# Patient Record
Sex: FEMALE | Race: WHITE
[De-identification: names, ages, dates, MRNs, and addresses within clinical notes are randomized per-mention and may not be internally consistent; named-entity substitution may affect disease eponyms.]

---

## 2021-03-19 ENCOUNTER — HOSPITAL ENCOUNTER (OUTPATIENT)
Dept: HOSPITAL 53 - M SDC | Age: 46
Discharge: HOME | End: 2021-03-19
Attending: PODIATRIST
Payer: COMMERCIAL

## 2021-03-19 VITALS — BODY MASS INDEX: 32.02 KG/M2 | HEIGHT: 62 IN | WEIGHT: 174 LBS

## 2021-03-19 VITALS — DIASTOLIC BLOOD PRESSURE: 72 MMHG | SYSTOLIC BLOOD PRESSURE: 118 MMHG

## 2021-03-19 DIAGNOSIS — M76.821: ICD-10-CM

## 2021-03-19 DIAGNOSIS — M79.671: ICD-10-CM

## 2021-03-19 DIAGNOSIS — Z88.0: ICD-10-CM

## 2021-03-19 DIAGNOSIS — F32.9: ICD-10-CM

## 2021-03-19 DIAGNOSIS — Z79.899: ICD-10-CM

## 2021-03-19 DIAGNOSIS — F41.9: ICD-10-CM

## 2021-03-19 DIAGNOSIS — M66.361: Primary | ICD-10-CM

## 2021-03-19 DIAGNOSIS — Z79.01: ICD-10-CM

## 2021-03-19 PROCEDURE — 88304 TISSUE EXAM BY PATHOLOGIST: CPT

## 2021-03-19 PROCEDURE — 73630 X-RAY EXAM OF FOOT: CPT

## 2021-03-19 PROCEDURE — 27691 REVISE LOWER LEG TENDON: CPT

## 2021-03-19 PROCEDURE — 28300 INCISION OF HEEL BONE: CPT

## 2021-03-19 RX ADMIN — FENTANYL CITRATE PRN MCG: 50 INJECTION, SOLUTION INTRAMUSCULAR; INTRAVENOUS at 14:23

## 2021-03-19 RX ADMIN — FENTANYL CITRATE PRN MCG: 50 INJECTION, SOLUTION INTRAMUSCULAR; INTRAVENOUS at 14:40

## 2021-03-19 NOTE — REP
INDICATION:

POST OP.



COMPARISON:

None.



TECHNIQUE:

Four views obtained through overlying splint material.



FINDINGS:

Four views of the right foot demonstrate metallic screw plate fixation of posterior

calcaneal osteotomy.  There is a plantar heel spur.  There is some spurring at the

talonavicular articulation with 2 accessory ossicles visible dorsally at that

articulation on lateral radiograph.  Overall mineralization pattern is normal.  There

is a surgical drain adjacent to the lateral aspect of the calcaneus..  .  .





IMPRESSION:

Status post calcaneal osteotomy with screw plate fixation..







<Electronically signed by Daquan Wisdom > 03/19/21 [id number]

## 2021-03-19 NOTE — RO
OPERATIVE NOTE



DATE OF OPERATION: 03/19/2021



PREOPERATIVE DIAGNOSIS: Posterior tibial tendon dysfunction, right foot. 



POSTOPERATIVE DIAGNOSIS: Posterior tibial tendon dysfunction, right foot. 



PROCEDURE: 

1.  Debridement, posterior tibial tendon, right foot.

2.  Flexor digitorum longus tendon transfer to the navicular, right foot.

3.  Medial calcaneal slide osteotomy with plate fixation, right foot.



SURGEON: CANDICE Carr ASSISTANT: None.



ANESTHESIA: General anesthesia.



IRRIGATION: Dilute bacitracin, neomycin and polymyxin B solution.



ESTIMATED BLOOD LOSS: 10 mL 



HEMOSTASIS: Thigh pneumatic tourniquet at 300 mmHg, the first inflation 91

minutes, rest time 34 minutes, second inflation 51 minutes.



HARDWARE UTILIZED: Arthrex 10 mm medial calcaneal slide plate with screw

fixation 3.5 mm x 26 mm x2 and a 3.5 x 28 mm x2. Interference screw utilized

was a 5.5 x 15 mm bioabsorbable screw.



DESCRIPTION OF OPERATION: On 3/19/2021, this 45-year-old white female was taken

from her hospital room to the operating room and placed on the operating table

in supine position. Following the induction of general anesthesia, the

following procedure was performed.



Debridement of posterior tibial tendon, right foot. Attention was directed to

the patient's right foot where an incision was made from just proximal to the

medial malleolus extending down to the navicular and then plantarly to the

plantar surface of the foot, creating an S-shaped incision. The incision was

deepened through subcutaneous tissues and all crossing venous tributaries were

identified and scored, clamped, cut, ligated and electrocoagulated as

necessary. Dissection was carried down to the level of the tendon sheath of the

posterior tibial tendon where the tendon sheath was opened and the tendon

proximally was noted to be normal in appearance. However, approximately 2 cm

proximal to its insertion point, there was noted to be a linear tear with

retraction of the tendon and significant fibrosis of the tendon.  This area was

attempted to be debrided. However, the tendon was nonfunctional in appearance a

firm. Therefore, it was elected to debride the tendon and then perform a weave

with flexor digitorum longus tendon transfer for augmentation. Therefore, the

following procedure was performed.



Flexor digitorum longus  tendon transfer to the navicular with insertion with a

5.5 x 15 mm interference screw. The tendon sheath was then entered for the

flexor digitorum longus tendon and then freed in a proximal and plantar

direction. The venous complex on the plantar surface of the foot was sutured

and electrocoagulated as needed. The area at the harvest site of the flexor

digitorum longus tendon was then transected and this area was then packed with

Gelfoam. The tendon was then measured to the navicular. Approximately 3 mm of

tendon had to be debrided from the flexor digitorum longus tendon for

physiologic tension to be placed at the socket site. The socket was then

created utilizing a 5.5 mm drill bit which was drilled approximately 18 mm in

depth utilizing a #2 FiberWire, a SpeedWhip suture was placed into the flexor

digitorum longus tendon and utilizing the normal the normal technique, the

tendon was placed into the socket and a 5.5 x 15 mm interference screw was then

drilled into place. This provided firm fixation of the flexor digitorum longus

tendon. The tourniquet was then released. All bleeders as encountered were

electrocoagulated and closure was obtained. The tendon sheath was then closed

with 4-0 Monocryl in a simple interrupted type fashion. The subcutaneous

tissues were coapted and maintained with 4-0 Monocryl in simple interrupted

type fashion. The skin incision was then coapted and maintained utilizing 3-0

nylon in a simple interrupted type fashion. The patient was then repositioned

onto the lateral decubitus position. After 34 minutes, the tourniquet was

reinflated and a 5 cm incision was placed over the lateral wall of the

calcaneus directly paralleling the peroneal tendons. The incision was then

deepened through the periosteum. C-arm images were obtained to appropriately

place the osteotomy site. Utilizing a sagittal saw, an osteotomy was then

performed through the calcaneus, taking care on the medial cortex. This was

then freed with an osteotome. Since the patient's calcaneal inclination angle

was quite low as the osteotomy was displaced 10 mm, it was also plantarflexed

approximately 3-4 mm to increase the calcaneal inclination angle and a

temporary fixation was utilized with a Edgardo wire from the posterior aspect

of the calcaneus into the body of the calcaneus and the 10 mm plate was applied

to the lateral surface of the calcaneus. C-arm imagery was utilized for

appropriate placement. The osteotomy upon completion was noted to be stable in

all three cardinal planes. The wound was flushed with copious amounts of dilute

bacitracin, neomycin and polymyxin B solution. The deep structures were then

coapted and maintained utilizing 3-0 Monocryl in a simple interrupted type

fashion. The subcutaneous tissues were coapted and maintained utilizing 4-0

Monocryl in a simple interrupted type fashion. The skin incision was coapted

and maintained utilizing 3-0 nylon in a simple interrupted type fashion.

Sterile dressing was applied consisting of Adaptic, 4x4s, 4x4 splints and

Kerlix. The thigh tourniquet was released. Capillary filling time was noted to

be immediate. A Manning compressive dressing with a posterior splint was then

applied. The patient apparently tolerated the surgical procedure well and was

taken from the OR to the recovery room for further monitoring by the anesthesia

department.  Postoperative instructions were given upon discharge.